# Patient Record
Sex: FEMALE | Race: WHITE | NOT HISPANIC OR LATINO | URBAN - METROPOLITAN AREA
[De-identification: names, ages, dates, MRNs, and addresses within clinical notes are randomized per-mention and may not be internally consistent; named-entity substitution may affect disease eponyms.]

---

## 2018-10-04 ENCOUNTER — APPOINTMENT (RX ONLY)
Dept: URBAN - METROPOLITAN AREA CLINIC 1 | Facility: CLINIC | Age: 58
Setting detail: DERMATOLOGY
End: 2018-10-04

## 2018-10-04 ENCOUNTER — APPOINTMENT (RX ONLY)
Dept: URBAN - NONMETROPOLITAN AREA CLINIC 13 | Facility: CLINIC | Age: 58
Setting detail: DERMATOLOGY
End: 2018-10-04

## 2018-10-04 DIAGNOSIS — Z41.9 ENCOUNTER FOR PROCEDURE FOR PURPOSES OTHER THAN REMEDYING HEALTH STATE, UNSPECIFIED: ICD-10-CM

## 2018-10-04 PROCEDURE — ? JUVEDERM VOLUMA XC INJECTION

## 2018-10-04 PROCEDURE — ? ADDITIONAL NOTES

## 2018-10-04 PROCEDURE — ? JUVEDERM ULTRA INJECTION

## 2018-10-04 NOTE — PROCEDURE: JUVEDERM ULTRA INJECTION
Vermilion Lips Filler Volume In Cc: 1
Consent: Written consent obtained. Risks include but not limited to bruising, beading, irregular texture, ulceration, infection, allergic reaction, scar formation, incomplete augmentation, temporary nature, procedural pain.
Decollete Filler Volume In Cc: 0
Use Map Statement For Sites (Optional): No
Detail Level: Detailed
Map Statement: See Attach Map for Complete Details
Anesthesia Type: 1% lidocaine with epinephrine
Post-Care Instructions: Patient instructed to apply ice to reduce swelling.
Anesthesia Volume In Cc: 0.5
Procedural Text: The filler was administered to the treatment areas noted above.
Additional Area 2 Location: chin area
Filler: Juvederm Ultra
Expiration Date (Month Year): 19/02/06
Price (Use Numbers Only, No Special Characters Or $): 084
Additional Area 1 Location: lips
Lot #: [id number]
Additional Anesthesia Volume In Cc: 6
Additional Area 5 Location: lower cheeks

## 2018-10-04 NOTE — PROCEDURE: JUVEDERM VOLUMA XC INJECTION
Additional Area 1 Volume In Cc: 0
Anesthesia Volume In Cc: 0.5
Use Map Statement For Sites (Optional): No
Consent: Written consent obtained. Risks include but not limited to bruising, beading, irregular texture, ulceration, infection, allergic reaction, scar formation, incomplete augmentation, temporary nature, procedural pain.
Cheeks Filler Volume In Cc: 2
Lot #: [id number]
Price (Use Numbers Only, No Special Characters Or $): 1800
Procedural Text: The filler was administered to the treatment areas noted above. Injected into cheek area.
Filler: Juvederm Voluma XC
Detail Level: Detailed
Post-Care Instructions: Patient instructed to apply ice to reduce swelling.
Expiration Date (Month Year): 8/19
Map Statment: See Attach Map for Complete Details

## 2019-04-18 ENCOUNTER — APPOINTMENT (RX ONLY)
Dept: URBAN - NONMETROPOLITAN AREA CLINIC 13 | Facility: CLINIC | Age: 59
Setting detail: DERMATOLOGY
End: 2019-04-18

## 2019-04-18 DIAGNOSIS — Z41.9 ENCOUNTER FOR PROCEDURE FOR PURPOSES OTHER THAN REMEDYING HEALTH STATE, UNSPECIFIED: ICD-10-CM

## 2019-04-18 PROCEDURE — ? JUVEDERM VOLUMA XC INJECTION

## 2019-04-18 PROCEDURE — ? BOTOX

## 2019-04-18 PROCEDURE — ? JUVEDERM ULTRA INJECTION

## 2019-04-18 NOTE — PROCEDURE: JUVEDERM VOLUMA XC INJECTION
Nasolabial Folds Filler Volume In Cc: 0
Detail Level: Detailed
Expiration Date (Month Year): 3/20
Consent: Written consent obtained. Risks include but not limited to bruising, beading, irregular texture, ulceration, infection, allergic reaction, scar formation, incomplete augmentation, temporary nature, procedural pain.
Include Cannula Information In Note?: No
Post-Care Instructions: Patient instructed to apply ice to reduce swelling.
Filler: Juvederm Voluma XC
Additional Area 1 Location: lateral mid face lift
Lot #: [id number]
Anesthesia Volume In Cc: 0.5
Map Statment: See Attach Map for Complete Details
Procedural Text: The filler was administered to the treatment areas noted above. Injected into cheek area.
Cheeks Filler Volume In Cc: 1
Price (Use Numbers Only, No Special Characters Or $): 900

## 2019-04-18 NOTE — PROCEDURE: JUVEDERM ULTRA INJECTION
No
Lot #: [id number]
Additional Anesthesia Volume In Cc: 6
Brows Filler Volume In Cc: 0
Additional Area 4 Location: L mlower cheek touch up
Use Map Statement For Sites (Optional): No
Marionette Lines Filler Volume In Cc: 1
Consent: Written consent obtained. Risks include but not limited to bruising, beading, irregular texture, ulceration, infection, allergic reaction, scar formation, incomplete augmentation, temporary nature, procedural pain.
Anesthesia Volume In Cc: 0.5
Procedural Text: The filler was administered to the treatment areas noted above.
Additional Area 2 Location: chin area
Expiration Date (Month Year): 12/19
Post-Care Instructions: Patient instructed to apply ice to reduce swelling.
Additional Area 5 Location: lower cheeks
Price (Use Numbers Only, No Special Characters Or $): 659
Detail Level: Detailed
Anesthesia Type: 1% lidocaine with epinephrine
Map Statement: See Attach Map for Complete Details
Filler: Juvederm Ultra

## 2019-04-18 NOTE — PROCEDURE: BOTOX
Additional Area 4 Location: brow
Additional Area 2 Location: caleb's,
Additional Area 2 Units: 0
Forehead Units: 5
Detail Level: Zone
Glabellar Complex Units: 25
Dilution (U/0.1 Cc): 1
Post-Care Instructions: Patient instructed to not lie down for 4 hours and limit physical activity for 24 hours. Patient instructed not to travel by airplane for 48 hours.
Lot #: 
Additional Area 5 Location: lip upper
Additional Area 1 Units: 15
Expiration Date (Month Year): 9/21
Price (Use Numbers Only, No Special Characters Or $): 891
Consent: Written consent obtained. Risks include but not limited to lid/brow ptosis, bruising, swelling, diplopia, temporary effect, incomplete chemical denervation.
Additional Area 6 Location: chin
Additional Area 1 Location: [place], L
Additional Area 3 Location: bunny lines

## 2019-10-31 ENCOUNTER — APPOINTMENT (RX ONLY)
Dept: URBAN - NONMETROPOLITAN AREA CLINIC 13 | Facility: CLINIC | Age: 59
Setting detail: DERMATOLOGY
End: 2019-10-31

## 2019-10-31 DIAGNOSIS — Z41.9 ENCOUNTER FOR PROCEDURE FOR PURPOSES OTHER THAN REMEDYING HEALTH STATE, UNSPECIFIED: ICD-10-CM

## 2019-10-31 PROCEDURE — ? BOTOX

## 2019-10-31 PROCEDURE — ? JUVEDERM VOLUMA XC INJECTION

## 2019-10-31 NOTE — PROCEDURE: BOTOX
Show Additional Area 6: Yes
[facility] Units: 0
Additional Area 3 Location: bunny lines
Additional Area 6 Location: chin
Show Right And Left Brow Units: No
Glabellar Complex Units: 25
Consent: Written consent obtained. Risks include but not limited to lid/brow ptosis, bruising, swelling, diplopia, temporary effect, incomplete chemical denervation.
Additional Area 5 Location: brow
Additional Area 2 Location: masseter, L and R
Expiration Date (Month Year): 5/22
Detail Level: Zone
Lot #: 
Post-Care Instructions: Patient instructed to not lie down for 4 hours and limit physical activity for 24 hours. Patient instructed not to travel by airplane for 48 hours.
Dilution (U/0.1 Cc): 1
Price (Use Numbers Only, No Special Characters Or $): 308
Additional Area 1 Location: [facility]
Additional Area 4 Location: lip upper
Forehead Units: 3

## 2019-10-31 NOTE — PROCEDURE: JUVEDERM VOLUMA XC INJECTION
Consent: Written consent obtained. Risks include but not limited to bruising, beading, irregular texture, ulceration, infection, allergic reaction, scar formation, incomplete augmentation, temporary nature, procedural pain.
Map Statment: See Attach Map for Complete Details
Nasolabial Folds Filler Volume In Cc: 0
Additional Area 3 Location: Left cheek touch up
Procedural Text: The filler was administered to the treatment areas noted above. Injected into cheek area.
Expiration Date (Month Year): 10/20
Additional Area 1 Location: lateral mid face lift
Filler: Juvederm Voluma XC
Price (Use Numbers Only, No Special Characters Or $): 1800
Post-Care Instructions: Patient instructed to apply ice to reduce swelling.
Include Cannula Information In Note?: No
Lot #: [id number]
Detail Level: Detailed
Anesthesia Volume In Cc: 0.5
Cheeks Filler Volume In Cc: 2
Additional Area 2 Location: jawline

## 2019-11-06 ENCOUNTER — APPOINTMENT (RX ONLY)
Dept: URBAN - NONMETROPOLITAN AREA CLINIC 13 | Facility: CLINIC | Age: 59
Setting detail: DERMATOLOGY
End: 2019-11-06

## 2019-11-06 DIAGNOSIS — Z41.9 ENCOUNTER FOR PROCEDURE FOR PURPOSES OTHER THAN REMEDYING HEALTH STATE, UNSPECIFIED: ICD-10-CM

## 2019-11-06 PROCEDURE — ? ADDITIONAL NOTES

## 2019-11-14 ENCOUNTER — APPOINTMENT (RX ONLY)
Dept: URBAN - METROPOLITAN AREA CLINIC 1 | Facility: CLINIC | Age: 59
Setting detail: DERMATOLOGY
End: 2019-11-14

## 2019-11-14 DIAGNOSIS — Z41.9 ENCOUNTER FOR PROCEDURE FOR PURPOSES OTHER THAN REMEDYING HEALTH STATE, UNSPECIFIED: ICD-10-CM

## 2019-11-14 PROCEDURE — ? BOTOX

## 2019-11-14 PROCEDURE — ? JUVEDERM VOLUMA XC INJECTION

## 2019-11-14 NOTE — PROCEDURE: BOTOX
Right Periorbital Skin Units: 0
Show Depressor Anguli Units: Yes
Show Mentalis Units: No
Additional Area 2 Location: masseter, L and R
Expiration Date (Month Year): 5/22
Lot #: 
Additional Area 1 Location: [facility]
Additional Area 4 Location: lip upper
Consent: Written consent obtained. Risks include but not limited to lid/brow ptosis, bruising, swelling, diplopia, temporary effect, incomplete chemical denervation.
Detail Level: Zone
Post-Care Instructions: Patient instructed to not lie down for 4 hours and limit physical activity for 24 hours. Patient instructed not to travel by airplane for 48 hours.
Additional Area 3 Location: bunny lines
Additional Area 6 Location: chin
Additional Area 5 Location: R side of forehead for peaked brow
Price (Use Numbers Only, No Special Characters Or $): 165
Dilution (U/0.1 Cc): 1
Additional Area 1 Units: 15

## 2019-11-14 NOTE — PROCEDURE: JUVEDERM VOLUMA XC INJECTION
Additional Area 1 Volume In Cc: 0
Use Map Statement For Sites (Optional): No
Cheeks Filler Volume In Cc: 0.5
Additional Area 2 Location: jawline
Map Statment: See Attach Map for Complete Details
Detail Level: Detailed
Lot #: [id number]
Additional Area 3 Location: Left cheek touch up
Post-Care Instructions: Patient instructed to apply ice to reduce swelling.
Additional Area 1 Location: lateral mid face lift
Filler: Juvederm Voluma XC
Procedural Text: The filler was administered to the treatment areas noted above. Injected into cheek area.\\n\\nTouch up to slight uneven cheeks.
Consent: Written consent obtained. Risks include but not limited to bruising, beading, irregular texture, ulceration, infection, allergic reaction, scar formation, incomplete augmentation, temporary nature, procedural pain.
Expiration Date (Month Year): 3/20

## 2020-01-01 ENCOUNTER — APPOINTMENT (RX ONLY)
Dept: URBAN - NONMETROPOLITAN AREA CLINIC 13 | Facility: CLINIC | Age: 60
Setting detail: DERMATOLOGY
End: 2020-01-01

## 2020-01-01 DIAGNOSIS — Z41.9 ENCOUNTER FOR PROCEDURE FOR PURPOSES OTHER THAN REMEDYING HEALTH STATE, UNSPECIFIED: ICD-10-CM

## 2020-01-01 PROCEDURE — ? JUVEDERM VOLBELLA INJECTION

## 2020-01-01 NOTE — PROCEDURE: JUVEDERM VOLBELLA INJECTION
Vermilion Lips Filler Volume In Cc: 1
Procedural Text: The filler was administered to the treatment areas noted above.
Additional Anesthesia Volume In Cc: 6
[place] Filler Volume In Cc: 0
Lot #: [id number]
Consent: Written consent obtained. Risks include but not limited to bruising, beading, irregular texture, ulceration, infection, allergic reaction, scar formation, incomplete augmentation, temporary nature, procedural pain.
Detail Level: Detailed
Additional Area 4 Location: lips
Anesthesia Volume In Cc: 0.5
Use Map Statement For Sites (Optional): No
Additional Area 2 Location: Scar on L side of face
Post-Care Instructions: Patient instructed to apply ice to reduce swelling.
Expiration Date (Month Year): 9/21
Filler: Juvederm Volbella XC
Additional Area 5 Location: corners of mouth
Anesthesia Type: 1% lidocaine with epinephrine
Additional Area 3 Location: glabella
Additional Area 1 Location: perioral lines
Price (Use Numbers Only, No Special Characters Or $): 598
Map Statment: See Attach Map for Complete Details